# Patient Record
Sex: FEMALE | Race: WHITE | NOT HISPANIC OR LATINO | Employment: OTHER | URBAN - METROPOLITAN AREA
[De-identification: names, ages, dates, MRNs, and addresses within clinical notes are randomized per-mention and may not be internally consistent; named-entity substitution may affect disease eponyms.]

---

## 2021-11-12 ENCOUNTER — OFFICE VISIT (OUTPATIENT)
Dept: OTOLARYNGOLOGY | Facility: CLINIC | Age: 46
End: 2021-11-12
Payer: COMMERCIAL

## 2021-11-12 ENCOUNTER — TELEPHONE (OUTPATIENT)
Dept: OTOLARYNGOLOGY | Facility: CLINIC | Age: 46
End: 2021-11-12

## 2021-11-12 VITALS
WEIGHT: 170 LBS | DIASTOLIC BLOOD PRESSURE: 92 MMHG | TEMPERATURE: 98.4 F | HEART RATE: 92 BPM | SYSTOLIC BLOOD PRESSURE: 142 MMHG

## 2021-11-12 DIAGNOSIS — H60.392 INFECTIVE OTITIS EXTERNA, LEFT: Primary | ICD-10-CM

## 2021-11-12 DIAGNOSIS — H66.002 NON-RECURRENT ACUTE SUPPURATIVE OTITIS MEDIA OF LEFT EAR WITHOUT SPONTANEOUS RUPTURE OF TYMPANIC MEMBRANE: ICD-10-CM

## 2021-11-12 PROCEDURE — 92504 EAR MICROSCOPY EXAMINATION: CPT | Performed by: OTOLARYNGOLOGY

## 2021-11-12 PROCEDURE — 99203 OFFICE O/P NEW LOW 30 MIN: CPT | Mod: 25 | Performed by: OTOLARYNGOLOGY

## 2021-11-12 RX ORDER — CIPROFLOXACIN AND DEXAMETHASONE 3; 1 MG/ML; MG/ML
4 SUSPENSION/ DROPS AURICULAR (OTIC) 2 TIMES DAILY
COMMUNITY
Start: 2021-11-10

## 2021-11-12 RX ORDER — OXYCODONE HYDROCHLORIDE 5 MG/1
5 TABLET ORAL EVERY 6 HOURS PRN
Qty: 10 TABLET | Refills: 0 | Status: SHIPPED | OUTPATIENT
Start: 2021-11-12 | End: 2021-11-22

## 2021-11-12 RX ORDER — SULFAMETHOXAZOLE/TRIMETHOPRIM 800-160 MG
1 TABLET ORAL 2 TIMES DAILY
Qty: 20 TABLET | Refills: 0 | Status: SHIPPED | OUTPATIENT
Start: 2021-11-12 | End: 2021-11-22

## 2021-11-12 RX ORDER — PREDNISONE 10 MG/1
30 TABLET ORAL DAILY
Qty: 15 TABLET | Refills: 0 | Status: SHIPPED | OUTPATIENT
Start: 2021-11-12 | End: 2021-11-17

## 2021-11-12 ASSESSMENT — PAIN SCALES - GENERAL: PAINLEVEL: MILD PAIN (2)

## 2021-11-12 NOTE — TELEPHONE ENCOUNTER
Reason for Call:  Same Day Appointment, Requested Provider: Amber Escobedo     PCP: Monica Adkins      Reason for visit: ear bleeding, puss, extreme pain     Duration of symptoms: several weeks     Have you been treated for this in the past? Yes     Additional comments: Patient has been treated by her PCP, emergency room and a chiropractor to help with her ear but it just keeps getting worse and worse. Requesting to get worked in for an emergency appointment today, 11/12/21 at [place] or any other location that has ENT today. Please call as soon as possible. A work in message has also been sent to the [facility] ENT team.      Can we leave a detailed message on this number? YES     Phone number patient can be reached at: Home number on file [phone] (home)     Best Time: any     Call taken on 11/12/2021 at 9:58 AM by Ashlyn Gray

## 2021-11-12 NOTE — TELEPHONE ENCOUNTER
Eleno Aguillon MD Lenarz, Anna, MA; [facility] T[facility]  Caller: Unspecified (Today,  9:50 AM)  She's from [place], [place]. I'd see if she can get in to see Dr. Clark or Dr. Escobedo in [place] since that is much closer. I'll be done by the time she gets to [place].     Thanks,       Chuckie

## 2021-11-12 NOTE — TELEPHONE ENCOUNTER
Please triage and see if patient needs to be seen today. Pt currently scheduled next week with Dr. Aguillon on 11/17.    Thanks, Darling

## 2021-11-12 NOTE — PATIENT INSTRUCTIONS
Per physician's instructions    ACUTE OTITIS EXTERNA INSTRUCTIONS    1. Acetaminophen 1000 mg every 6 hours as needed  2. Ibuprofen 600 mg every 6 hours as needed  3. Oxycodone 5 mg every 4 hours as needed  4. Oral antibiotic as prescribed  5. Ciprodex drops 5 drops to draining ear twice daily for 10 days  6. Return for ear debridement as scheduled

## 2021-11-12 NOTE — TELEPHONE ENCOUNTER
Reason for Call:  Same Day Appointment, Requested Provider:  Eleno Aguillon MD    PCP: Monica Adkins    Reason for visit: ear bleeding, puss, extreme pain    Duration of symptoms: several weeks    Have you been treated for this in the past? Yes    Additional comments: Patient has been treated by her PCP, emergency room and a chiropractor to help with her ear but it just keeps getting worse and worse. Requesting to get worked in for an emergency appointment today, 11/12/21 at [place] or any other location that has ENT today. Please call as soon as possible.     Can we leave a detailed message on this number? YES    Phone number patient can be reached at: Home number on file [phone] (home)    Best Time: any    Call taken on 11/12/2021 at 9:50 AM by Ashlyn Gray

## 2021-11-12 NOTE — PROGRESS NOTES
Chief Complaint   Patient presents with     RECHECK     History of Present Illness  Ava Lopez is a 46 year old female who presents today for follow-up.  I evaluated the patient on 11/12/2021 with left otitis externa/otitis media.  I had her continue the Ciprodex drops, I placed her on oral Bactrim and a burst of prednisone.  We also discussed pain control including Tylenol, ibuprofen, and oral oxycodone as needed for breakthrough.  The patient returns today for follow-up.    Since last seeing the patient, the patient reports overall improvement in her symptoms.  The left ear is still quite plugged but she is no longer having ear pain has been able to sleep at night.  She is noticing more pulsatile tinnitus and some decreased hearing on the left.  She does not have any prior history of ear surgery.    Past Medical History  There is no problem list on file for this patient.    Current Medications    Current Outpatient Medications:      ciprofloxacin-dexamethasone (CIPRODEX) 0.3-0.1 % otic suspension, Place 4 drops Into the left ear 2 times daily, Disp: , Rfl:      oxyCODONE (ROXICODONE) 5 MG tablet, Take 1 tablet (5 mg) by mouth every 6 hours as needed for severe pain Pain not controlled with acetaminophen or ibuprofen., Disp: 10 tablet, Rfl: 0     predniSONE (DELTASONE) 10 MG tablet, Take 3 tablets (30 mg) by mouth daily for 5 days, Disp: 15 tablet, Rfl: 0     sulfamethoxazole-trimethoprim (BACTRIM DS) 800-160 MG tablet, Take 1 tablet by mouth 2 times daily for 10 days, Disp: 20 tablet, Rfl: 0    Allergies  Allergies   Allergen Reactions     Benzodiazepines Headache       Social History  Social History     Socioeconomic History     Marital status:      Spouse name: Not on file     Number of children: Not on file     Years of education: Not on file     Highest education level: Not on file   Occupational History     Not on file   Tobacco Use     Smoking status: Never Smoker     Smokeless tobacco: Never  "Used   Substance and Sexual Activity     Alcohol use: Never     Drug use: Never     Sexual activity: Not on file   Other Topics Concern     Not on file   Social History Narrative     Not on file     Social Determinants of Health     Financial Resource Strain: Not on file   Food Insecurity: Not on file   Transportation Needs: Not on file   Physical Activity: Not on file   Stress: Not on file   Social Connections: Not on file   Intimate Partner Violence: Not on file   Housing Stability: Not on file       Family History  Family History   Problem Relation Age of Onset     Coronary Artery Disease Father 36        MI       Review of Systems  As per HPI and PMHx, otherwise 10 system review including the head and neck, constitutional, eyes, respiratory, GI, skin, neurologic, lymphatic, endocrine, and allergy systems is negative.    Physical Exam  /77 (BP Location: Right arm, Patient Position: Sitting, Cuff Size: Adult Regular)   Pulse 78   Temp 98.2  F (36.8  C) (Tympanic)   Ht 1.702 m (5' 7\")   Wt 77.1 kg (170 lb)   BMI 26.63 kg/m    GENERAL: Patient is a pleasant, cooperative 46 year old female in no acute distress.  HEAD: Normocephalic, atraumatic.  Hair and scalp are normal.  EYES: Pupils are equal, round, reactive to light and accommodation.  Extraocular movements are intact.  The sclera nonicteric without injection.  The extraocular structures are normal.  EARS: See below.  NEUROLOGIC: Cranial nerves II through XII are grossly intact.  Voice is strong.  Patient is House-Brackmann I/VI bilaterally.  CARDIOVASCULAR: Extremities are warm and well-perfused.  No significant peripheral edema.  RESPIRATORY: Patient has nonlabored breathing without cough, wheeze, stridor.  PSYCHIATRIC: Patient is alert and oriented.  Mood and affect appear normal.  SKIN: Warm and dry.  No scalp, face, or neck lesions noted.    Physical Exam and Procedure    EARS: Normal shape and symmetry.  No tenderness when palpating the mastoid " or tragal areas bilaterally.  The ears were then examined under the otic binocular microscope to perform cerumen removal.  Otoscopic exam on the right reveals a clear canal.  The right tympanic membrane was round, intact without evidence of effusion.  No retraction, granulation, drainage, or evidence of cholesteatoma.      Attention was turned to the left ear.  Otoscopic exam on the left reveals moist ceruminous debris impacted down to the level of the tympanic membrane.  The cerumen debris and otorrhea were removed with a #5 suction. The left tympanic membrane was round, intact with obvious evidence of serous middle ear effusion.   No retraction, granulation, or evidence of cholesteatoma.  Tuning fork examination reveals bone conduction greater than air conduction on the left.    Assessment and Plan     ICD-10-CM    1. Infective otitis externa, left  H60.392 Remove Cerumen   2. Non-recurrent acute suppurative otitis media of left ear without spontaneous rupture of tympanic membrane  H66.002 Remove Cerumen      It was my pleasure seeing Ava Lopez today in clinic.  The patient has had significant interval improvement in her symptoms but does have otitis media with effusion on the left.  She does have a flight in about a week and I doubt that she will be able to clear her effusion by then.  We discussed careful observation with seeing her back next week prior to her flight with ear tube placement versus placing an ear tube now.  She is really bothered by the hearing loss and pulsatile tinnitus and has had symptoms for over a month.  She feels like she would just like an ear tube, which I think is reasonable.    We discussed the risks, benefits, alternatives, options of left myringotomy with tube placement including, but not limited to: Risk of bleeding, risk of infection, risk of retained tympanostomy tube, risk of tympanic membrane perforation, risk of recurrent otorrhea, tympanostomy tube failure, potential  need for additional procedures including tube removal/replacement.  We discussed the postoperative course and convalescence including using eardrops.  The patient voiced understanding and is willing to proceed.  Please see the procedure note for further details.    The patient will finish up her eardrops, oral antibiotic, and prednisone.  I will see her back in 4-6 weeks with an audiogram.    Ezequiel Escobedo MD  Department of Otolaryngology-Head and Neck Surgery  [facility]

## 2021-11-12 NOTE — PROGRESS NOTES
Chief Complaint   Patient presents with     Ear Problem     c/o ear pressure and some drainage noted seen in ER  in [facility] and put on ciprodex drops- ear still draining and feels better after using Ibuprofen 800 mg - history of right ear infection on 10/28/21     History of Present Illness  Ava Lopez is a 46 year old female who presents to today for ear evaluation.  Patient reports a several day history of worsening ear pain/pressure/plugging, and now otorrhea on the left-hand side.  Symptoms started initially in October where she had Covid.  She developed bilateral middle ear effusion.  She saw a chiropractor and after adjustment felt like her nose and sinuses drained really well but then she began to have some issues in the left ear.  She was seen in an urgent setting and they placed her on some eardrops for otitis externa.  Prior to this, she was on oral amoxicillin for a period of time without significant benefit or improvement.  She reports left right-sided otalgia, otorrhea, bloody otorrhea.  No significant vertigo.  No prior history of ear surgery.  She denies any recent water exposure or ear canal trauma.     Past Medical History  There is no problem list on file for this patient.    Current Medications     Current Outpatient Medications:      ciprofloxacin-dexamethasone (CIPRODEX) 0.3-0.1 % otic suspension, Place 4 drops Into the left ear 2 times daily, Disp: , Rfl:      oxyCODONE (ROXICODONE) 5 MG tablet, Take 1 tablet (5 mg) by mouth every 6 hours as needed for severe pain Pain not controlled with acetaminophen or ibuprofen., Disp: 10 tablet, Rfl: 0     predniSONE (DELTASONE) 10 MG tablet, Take 3 tablets (30 mg) by mouth daily for 5 days, Disp: 15 tablet, Rfl: 0     sulfamethoxazole-trimethoprim (BACTRIM DS) 800-160 MG tablet, Take 1 tablet by mouth 2 times daily for 10 days, Disp: 20 tablet, Rfl: 0    Allergies  Allergies   Allergen Reactions     Benzodiazepines Headache       Social  History   Social History     Socioeconomic History     Marital status:      Spouse name: Not on file     Number of children: Not on file     Years of education: Not on file     Highest education level: Not on file   Occupational History     Not on file   Tobacco Use     Smoking status: Not on file     Smokeless tobacco: Not on file   Substance and Sexual Activity     Alcohol use: Not on file     Drug use: Not on file     Sexual activity: Not on file   Other Topics Concern     Not on file   Social History Narrative     Not on file     Social Determinants of Health     Financial Resource Strain: Not on file   Food Insecurity: Not on file   Transportation Needs: Not on file   Physical Activity: Not on file   Stress: Not on file   Social Connections: Not on file   Intimate Partner Violence: Not on file   Housing Stability: Not on file       Family History  Family History   Problem Relation Age of Onset     Coronary Artery Disease Father 36        MI       Review of Systems  As per HPI and PMHx, otherwise 10+ comprehensive system review is negative.    Physical Exam  BP (!) 142/92 (BP Location: Right arm, Patient Position: Chair, Cuff Size: Adult Regular)   Pulse 92   Temp 98.4  F (36.9  C) (Tympanic)   Wt 77.1 kg (170 lb)   GENERAL: Patient is a pleasant, cooperative 46 year old female in no acute distress.  HEAD: Normocephalic, atraumatic.  Hair and scalp are normal.  EYES: Pupils are equal, round, reactive to light and accommodation.  Extraocular movements are intact.  The sclera nonicteric without injection.  The extraocular structures are normal.  EARS: See below..    NEUROLOGIC: Cranial nerves II through XII are grossly intact.  Voice is strong.  Patient is House-Brackmann I/VI bilaterally.  CARDIOVASCULAR: Extremities are warm and well-perfused.  No significant peripheral edema.  RESPIRATORY: Patient has nonlabored breathing without cough, wheeze, stridor.  PSYCHIATRIC: Patient is alert and oriented.   Mood and affect appear normal.  SKIN: Warm and dry.  No scalp, face, or neck lesions noted.    Physical Exam and Procedure    EARS: Normal shape and symmetry.  No tenderness when palpating the mastoid areas bilaterally.  No mastoid erythema or fluctuance.  She does have some mild tragal tenderness on the left.  The ears were then examined under the otic binocular microscope to perform cerumen removal.  Otoscopic exam on the right reveals a clear canal.  The right tympanic membrane was round, intact without evidence of effusion.  No retraction, granulation, drainage, or evidence of cholesteatoma.      Attention was turned to the left ear.  Otoscopic exam on the left reveals moist ceruminous debris impacted down to the level of the tympanic membrane.  The cerumen was removed with a #5 suction.  The left tympanic membrane appears intact but very thickened with some granulation posteriorly and some active otorrhea from the anterior sulcus.  There does appear to be evidence of middle ear effusion/purulent middle ear effusion.  No significant retraction.    Assessment and Plan     ICD-10-CM    1. Infective otitis externa, left  H60.392 Remove Cerumen     sulfamethoxazole-trimethoprim (BACTRIM DS) 800-160 MG tablet     predniSONE (DELTASONE) 10 MG tablet     oxyCODONE (ROXICODONE) 5 MG tablet   2. Non-recurrent acute suppurative otitis media of left ear without spontaneous rupture of tympanic membrane  H66.002 Remove Cerumen     sulfamethoxazole-trimethoprim (BACTRIM DS) 800-160 MG tablet     predniSONE (DELTASONE) 10 MG tablet     oxyCODONE (ROXICODONE) 5 MG tablet     It was my pleasure seeing Ava STEVAN Quinterosagusto today in clinic.  The patient has a left ear otitis externa/otitis media.  I debrided the canal under the microscope.  She has Ciprodex drops that she has been prescribed.  We will continue the Ciprodex drops 4 to 5 drops twice a day for 10 days.  She was on oral amoxicillin without benefit.  She also struggled  with antibiotic associated diarrhea with the amoxicillin.  I think we will trial Bactrim DS twice a day for 10 days.  I will provide her with a short burst of prednisone to help with the swelling and discomfort.  We discussed Tylenol and ibuprofen alternating for pain control.  I also gave her some oxycodone for severe or breakthrough pain.  The patient will return to see me on Monday for repeat examination and debridement.  I discussed keeping the ear dry, and to not place anything in the ear except for the ear drops.     Ava to follow up with Primary Care provider regarding elevated blood pressure.    Ezequiel Escobedo MD  Department of Otolaryngology-Head and Neck Surgery  [facility]

## 2021-11-12 NOTE — LETTER
11/12/2021         RE: Ava Lopez  [address] [facility] [address]        Dear Colleague,    Thank you for referring your patient, Ava Lopez, to the [facility]. Please see a copy of my visit note below.    Chief Complaint   Patient presents with     Ear Problem     c/o ear pressure and some drainage noted seen in ER  in [facility] and put on ciprodex drops- ear still draining and feels better after using Ibuprofen 800 mg - history of right ear infection on 10/28/21     History of Present Illness  Ava Lopez is a 46 year old female who presents to today for ear evaluation.  Patient reports a several day history of worsening ear pain/pressure/plugging, and now otorrhea on the left-hand side.  Symptoms started initially in October where she had Covid.  She developed bilateral middle ear effusion.  She saw a chiropractor and after adjustment felt like her nose and sinuses drained really well but then she began to have some issues in the left ear.  She was seen in an urgent setting and they placed her on some eardrops for otitis externa.  Prior to this, she was on oral amoxicillin for a period of time without significant benefit or improvement.  She reports left right-sided otalgia, otorrhea, bloody otorrhea.  No significant vertigo.  No prior history of ear surgery.  She denies any recent water exposure or ear canal trauma.     Past Medical History  There is no problem list on file for this patient.    Current Medications     Current Outpatient Medications:      ciprofloxacin-dexamethasone (CIPRODEX) 0.3-0.1 % otic suspension, Place 4 drops Into the left ear 2 times daily, Disp: , Rfl:      oxyCODONE (ROXICODONE) 5 MG tablet, Take 1 tablet (5 mg) by mouth every 6 hours as needed for severe pain Pain not controlled with acetaminophen or ibuprofen., Disp: 10 tablet, Rfl: 0     predniSONE (DELTASONE) 10 MG tablet, Take 3 tablets (30 mg) by mouth daily for  5 days, Disp: 15 tablet, Rfl: 0     sulfamethoxazole-trimethoprim (BACTRIM DS) 800-160 MG tablet, Take 1 tablet by mouth 2 times daily for 10 days, Disp: 20 tablet, Rfl: 0    Allergies  Allergies   Allergen Reactions     Benzodiazepines Headache       Social History   Social History     Socioeconomic History     Marital status:      Spouse name: Not on file     Number of children: Not on file     Years of education: Not on file     Highest education level: Not on file   Occupational History     Not on file   Tobacco Use     Smoking status: Not on file     Smokeless tobacco: Not on file   Substance and Sexual Activity     Alcohol use: Not on file     Drug use: Not on file     Sexual activity: Not on file   Other Topics Concern     Not on file   Social History Narrative     Not on file     Social Determinants of Health     Financial Resource Strain: Not on file   Food Insecurity: Not on file   Transportation Needs: Not on file   Physical Activity: Not on file   Stress: Not on file   Social Connections: Not on file   Intimate Partner Violence: Not on file   Housing Stability: Not on file       Family History  Family History   Problem Relation Age of Onset     Coronary Artery Disease Father 36        MI       Review of Systems  As per HPI and PMHx, otherwise 10+ comprehensive system review is negative.    Physical Exam  BP (!) 142/92 (BP Location: Right arm, Patient Position: Chair, Cuff Size: Adult Regular)   Pulse 92   Temp 98.4  F (36.9  C) (Tympanic)   Wt 77.1 kg (170 lb)   GENERAL: Patient is a pleasant, cooperative 46 year old female in no acute distress.  HEAD: Normocephalic, atraumatic.  Hair and scalp are normal.  EYES: Pupils are equal, round, reactive to light and accommodation.  Extraocular movements are intact.  The sclera nonicteric without injection.  The extraocular structures are normal.  EARS: See below..    NEUROLOGIC: Cranial nerves II through XII are grossly intact.  Voice is strong.   Patient is House-Brackmann I/VI bilaterally.  CARDIOVASCULAR: Extremities are warm and well-perfused.  No significant peripheral edema.  RESPIRATORY: Patient has nonlabored breathing without cough, wheeze, stridor.  PSYCHIATRIC: Patient is alert and oriented.  Mood and affect appear normal.  SKIN: Warm and dry.  No scalp, face, or neck lesions noted.    Physical Exam and Procedure    EARS: Normal shape and symmetry.  No tenderness when palpating the mastoid areas bilaterally.  No mastoid erythema or fluctuance.  She does have some mild tragal tenderness on the left.  The ears were then examined under the otic binocular microscope to perform cerumen removal.  Otoscopic exam on the right reveals a clear canal.  The right tympanic membrane was round, intact without evidence of effusion.  No retraction, granulation, drainage, or evidence of cholesteatoma.      Attention was turned to the left ear.  Otoscopic exam on the left reveals moist ceruminous debris impacted down to the level of the tympanic membrane.  The cerumen was removed with a #5 suction.  The left tympanic membrane appears intact but very thickened with some granulation posteriorly and some active otorrhea from the anterior sulcus.  There does appear to be evidence of middle ear effusion/purulent middle ear effusion.  No significant retraction.    Assessment and Plan     ICD-10-CM    1. Infective otitis externa, left  H60.392 Remove Cerumen     sulfamethoxazole-trimethoprim (BACTRIM DS) 800-160 MG tablet     predniSONE (DELTASONE) 10 MG tablet     oxyCODONE (ROXICODONE) 5 MG tablet   2. Non-recurrent acute suppurative otitis media of left ear without spontaneous rupture of tympanic membrane  H66.002 Remove Cerumen     sulfamethoxazole-trimethoprim (BACTRIM DS) 800-160 MG tablet     predniSONE (DELTASONE) 10 MG tablet     oxyCODONE (ROXICODONE) 5 MG tablet     It was my pleasure seeing Ava Lopez today in clinic.  The patient has a left ear otitis  externa/otitis media.  I debrided the canal under the microscope.  She has Ciprodex drops that she has been prescribed.  We will continue the Ciprodex drops 4 to 5 drops twice a day for 10 days.  She was on oral amoxicillin without benefit.  She also struggled with antibiotic associated diarrhea with the amoxicillin.  I think we will trial Bactrim DS twice a day for 10 days.  I will provide her with a short burst of prednisone to help with the swelling and discomfort.  We discussed Tylenol and ibuprofen alternating for pain control.  I also gave her some oxycodone for severe or breakthrough pain.  The patient will return to see me on Monday for repeat examination and debridement.  I discussed keeping the ear dry, and to not place anything in the ear except for the ear drops.     Ava to follow up with Primary Care provider regarding elevated blood pressure.    Ezequiel Escobedo MD  Department of Otolaryngology-Head and Neck Surgery  [facility]         Again, thank you for allowing me to participate in the care of your patient.        Sincerely,        Ezequiel Escobedo MD

## 2021-11-12 NOTE — NURSING NOTE
Initial BP (!) 142/92 (BP Location: Right arm, Patient Position: Chair, Cuff Size: Adult Regular)   Pulse 92   Temp 98.4  F (36.9  C) (Tympanic)   Wt 77.1 kg (170 lb)  There is no height or weight on file to calculate BMI. .    Josiane Martinez LPN

## 2021-11-15 ENCOUNTER — OFFICE VISIT (OUTPATIENT)
Dept: OTOLARYNGOLOGY | Facility: CLINIC | Age: 46
End: 2021-11-15
Payer: COMMERCIAL

## 2021-11-15 VITALS
HEIGHT: 67 IN | BODY MASS INDEX: 26.68 KG/M2 | DIASTOLIC BLOOD PRESSURE: 77 MMHG | WEIGHT: 170 LBS | TEMPERATURE: 98.2 F | SYSTOLIC BLOOD PRESSURE: 133 MMHG | HEART RATE: 78 BPM

## 2021-11-15 DIAGNOSIS — H66.002 NON-RECURRENT ACUTE SUPPURATIVE OTITIS MEDIA OF LEFT EAR WITHOUT SPONTANEOUS RUPTURE OF TYMPANIC MEMBRANE: ICD-10-CM

## 2021-11-15 DIAGNOSIS — H60.392 INFECTIVE OTITIS EXTERNA, LEFT: Primary | ICD-10-CM

## 2021-11-15 PROCEDURE — 99213 OFFICE O/P EST LOW 20 MIN: CPT | Mod: 25 | Performed by: OTOLARYNGOLOGY

## 2021-11-15 PROCEDURE — 69433 CREATE EARDRUM OPENING: CPT | Mod: LT | Performed by: OTOLARYNGOLOGY

## 2021-11-15 ASSESSMENT — MIFFLIN-ST. JEOR: SCORE: 1448.74

## 2021-11-15 NOTE — PROCEDURES
PREOPERATIVE DIAGNOSES: Left chronic otitis media with effusion, history of acute otitis media, left conductive hearing loss.    POSTOPERATIVE DIAGNOSES: Same.     PROCEDURE PERFORMED:   1. Left myringotomy with tympanostomy tube placement     SURGEON: Ezequiel Escobedo MD      ASSISTANTS: None.     BLOOD LOSS: Scant.     COMPLICATIONS: None.      SPECIMENS: None.     ANESTHESIA: Local.     GRAFTS, IMPLANTS, DRAINS: None.     INDICATIONS: Prevent complications, treat disease.    FINDINGS:   1. Left intact tympanic membrane with copious serous middle-ear effusion.    OPERATIVE TECHNIQUE: The patient was brought to the procedure room and identified by name clinic number.  They were placed supinely on the procedure chair.  The patient was prepped and draped in standard fashion.  After standard surgical pause, the microscope was brought to the field and used throughout the remainder of the case.  I examined the ear on the left. Cerumen was cleaned with curette.  Phenol was placed in the posterior-inferior quadrant.  A radial myringotomy was made in the posterior-inferior quadrant.  The middle ear was suctioned free.  The patient noticed improvement in hearing.  A Dura-Vent ear tube was placed into the myringotomy.       This marked the end of the procedure.  The patient tolerated the procedure well.  There were no complications.  There was minimal blood loss.  All standard protocol and universal precautions were used throughout the procedure.    Ezequiel Escobedo MD  Department of Otolaryngology-Head and Neck Surgery  [facility]

## 2021-11-15 NOTE — NURSING NOTE
"Initial /77 (BP Location: Right arm, Patient Position: Sitting, Cuff Size: Adult Regular)   Pulse 78   Temp 98.2  F (36.8  C) (Tympanic)   Ht 1.702 m (5' 7\")   Wt 77.1 kg (170 lb)   BMI 26.63 kg/m   Estimated body mass index is 26.63 kg/m  as calculated from the following:    Height as of this encounter: 1.702 m (5' 7\").    Weight as of this encounter: 77.1 kg (170 lb). .    Thania Meehan LPN on 11/15/2021 at 8:02 AM    "

## 2021-11-15 NOTE — LETTER
11/15/2021         RE: Ava Lopez  [address] [address] [facility] [address]        Dear Colleague,    Thank you for referring your patient, Ava Lopez, to the [facility]. Please see a copy of my visit note below.    Chief Complaint   Patient presents with     RECHECK     History of Present Illness  Ava Lopez is a 46 year old female who presents today for follow-up.  I evaluated the patient on 11/12/2021 with left otitis externa/otitis media.  I had her continue the Ciprodex drops, I placed her on oral Bactrim and a burst of prednisone.  We also discussed pain control including Tylenol, ibuprofen, and oral oxycodone as needed for breakthrough.  The patient returns today for follow-up.    Since last seeing the patient, the patient reports overall improvement in her symptoms.  The left ear is still quite plugged but she is no longer having ear pain has been able to sleep at night.  She is noticing more pulsatile tinnitus and some decreased hearing on the left.  She does not have any prior history of ear surgery.    Past Medical History  There is no problem list on file for this patient.    Current Medications    Current Outpatient Medications:      ciprofloxacin-dexamethasone (CIPRODEX) 0.3-0.1 % otic suspension, Place 4 drops Into the left ear 2 times daily, Disp: , Rfl:      oxyCODONE (ROXICODONE) 5 MG tablet, Take 1 tablet (5 mg) by mouth every 6 hours as needed for severe pain Pain not controlled with acetaminophen or ibuprofen., Disp: 10 tablet, Rfl: 0     predniSONE (DELTASONE) 10 MG tablet, Take 3 tablets (30 mg) by mouth daily for 5 days, Disp: 15 tablet, Rfl: 0     sulfamethoxazole-trimethoprim (BACTRIM DS) 800-160 MG tablet, Take 1 tablet by mouth 2 times daily for 10 days, Disp: 20 tablet, Rfl: 0    Allergies  Allergies   Allergen Reactions     Benzodiazepines Headache       Social History  Social History     Socioeconomic History     Marital status:  "     Spouse name: Not on file     Number of children: Not on file     Years of education: Not on file     Highest education level: Not on file   Occupational History     Not on file   Tobacco Use     Smoking status: Never Smoker     Smokeless tobacco: Never Used   Substance and Sexual Activity     Alcohol use: Never     Drug use: Never     Sexual activity: Not on file   Other Topics Concern     Not on file   Social History Narrative     Not on file     Social Determinants of Health     Financial Resource Strain: Not on file   Food Insecurity: Not on file   Transportation Needs: Not on file   Physical Activity: Not on file   Stress: Not on file   Social Connections: Not on file   Intimate Partner Violence: Not on file   Housing Stability: Not on file       Family History  Family History   Problem Relation Age of Onset     Coronary Artery Disease Father 36        MI       Review of Systems  As per HPI and PMHx, otherwise 10 system review including the head and neck, constitutional, eyes, respiratory, GI, skin, neurologic, lymphatic, endocrine, and allergy systems is negative.    Physical Exam  /77 (BP Location: Right arm, Patient Position: Sitting, Cuff Size: Adult Regular)   Pulse 78   Temp 98.2  F (36.8  C) (Tympanic)   Ht 1.702 m (5' 7\")   Wt 77.1 kg (170 lb)   BMI 26.63 kg/m    GENERAL: Patient is a pleasant, cooperative 46 year old female in no acute distress.  HEAD: Normocephalic, atraumatic.  Hair and scalp are normal.  EYES: Pupils are equal, round, reactive to light and accommodation.  Extraocular movements are intact.  The sclera nonicteric without injection.  The extraocular structures are normal.  EARS: See below.  NEUROLOGIC: Cranial nerves II through XII are grossly intact.  Voice is strong.  Patient is House-Brackmann I/VI bilaterally.  CARDIOVASCULAR: Extremities are warm and well-perfused.  No significant peripheral edema.  RESPIRATORY: Patient has nonlabored breathing without cough, " wheeze, stridor.  PSYCHIATRIC: Patient is alert and oriented.  Mood and affect appear normal.  SKIN: Warm and dry.  No scalp, face, or neck lesions noted.    Physical Exam and Procedure    EARS: Normal shape and symmetry.  No tenderness when palpating the mastoid or tragal areas bilaterally.  The ears were then examined under the otic binocular microscope to perform cerumen removal.  Otoscopic exam on the right reveals a clear canal.  The right tympanic membrane was round, intact without evidence of effusion.  No retraction, granulation, drainage, or evidence of cholesteatoma.      Attention was turned to the left ear.  Otoscopic exam on the left reveals moist ceruminous debris impacted down to the level of the tympanic membrane.  The cerumen debris and otorrhea were removed with a #5 suction. The left tympanic membrane was round, intact with obvious evidence of serous middle ear effusion.   No retraction, granulation, or evidence of cholesteatoma.  Tuning fork examination reveals bone conduction greater than air conduction on the left.    Assessment and Plan     ICD-10-CM    1. Infective otitis externa, left  H60.392 Remove Cerumen   2. Non-recurrent acute suppurative otitis media of left ear without spontaneous rupture of tympanic membrane  H66.002 Remove Cerumen      It was my pleasure seeing Ava Lopez today in clinic.  The patient has had significant interval improvement in her symptoms but does have otitis media with effusion on the left.  She does have a flight in about a week and I doubt that she will be able to clear her effusion by then.  We discussed careful observation with seeing her back next week prior to her flight with ear tube placement versus placing an ear tube now.  She is really bothered by the hearing loss and pulsatile tinnitus and has had symptoms for over a month.  She feels like she would just like an ear tube, which I think is reasonable.    We discussed the risks, benefits,  alternatives, options of left myringotomy with tube placement including, but not limited to: Risk of bleeding, risk of infection, risk of retained tympanostomy tube, risk of tympanic membrane perforation, risk of recurrent otorrhea, tympanostomy tube failure, potential need for additional procedures including tube removal/replacement.  We discussed the postoperative course and convalescence including using eardrops.  The patient voiced understanding and is willing to proceed.  Please see the procedure note for further details.    The patient will finish up her eardrops, oral antibiotic, and prednisone.  I will see her back in 4-6 weeks with an audiogram.    Ezequiel Escobedo MD  Department of Otolaryngology-Head and Neck Surgery  [facility]         Again, thank you for allowing me to participate in the care of your patient.        Sincerely,        Ezequiel Escobedo MD

## 2021-11-22 ENCOUNTER — OFFICE VISIT (OUTPATIENT)
Dept: OTOLARYNGOLOGY | Facility: CLINIC | Age: 46
End: 2021-11-22
Payer: COMMERCIAL

## 2021-11-22 ENCOUNTER — OFFICE VISIT (OUTPATIENT)
Dept: AUDIOLOGY | Facility: CLINIC | Age: 46
End: 2021-11-22
Payer: COMMERCIAL

## 2021-11-22 VITALS
HEART RATE: 72 BPM | DIASTOLIC BLOOD PRESSURE: 81 MMHG | WEIGHT: 170 LBS | TEMPERATURE: 98.2 F | BODY MASS INDEX: 26.63 KG/M2 | SYSTOLIC BLOOD PRESSURE: 129 MMHG

## 2021-11-22 DIAGNOSIS — H93.8X2 EAR FULLNESS, LEFT: ICD-10-CM

## 2021-11-22 DIAGNOSIS — H90.72 MIXED CONDUCTIVE AND SENSORINEURAL HEARING LOSS OF LEFT EAR WITH UNRESTRICTED HEARING OF RIGHT EAR: ICD-10-CM

## 2021-11-22 DIAGNOSIS — H60.392 INFECTIVE OTITIS EXTERNA, LEFT: Primary | ICD-10-CM

## 2021-11-22 DIAGNOSIS — H90.72 MIXED CONDUCTIVE AND SENSORINEURAL HEARING LOSS OF LEFT EAR WITH UNRESTRICTED HEARING OF RIGHT EAR: Primary | ICD-10-CM

## 2021-11-22 DIAGNOSIS — Z96.22 RETAINED MYRINGOTOMY TUBE IN LEFT EAR: ICD-10-CM

## 2021-11-22 DIAGNOSIS — H66.002 NON-RECURRENT ACUTE SUPPURATIVE OTITIS MEDIA OF LEFT EAR WITHOUT SPONTANEOUS RUPTURE OF TYMPANIC MEMBRANE: ICD-10-CM

## 2021-11-22 PROCEDURE — 92504 EAR MICROSCOPY EXAMINATION: CPT | Performed by: OTOLARYNGOLOGY

## 2021-11-22 PROCEDURE — 92557 COMPREHENSIVE HEARING TEST: CPT | Performed by: AUDIOLOGIST

## 2021-11-22 PROCEDURE — 92550 TYMPANOMETRY & REFLEX THRESH: CPT | Performed by: AUDIOLOGIST

## 2021-11-22 PROCEDURE — 99207 PR NO CHARGE LOS: CPT | Performed by: AUDIOLOGIST

## 2021-11-22 PROCEDURE — 99024 POSTOP FOLLOW-UP VISIT: CPT | Mod: 25 | Performed by: OTOLARYNGOLOGY

## 2021-11-22 RX ORDER — GUAIFENESIN AND DEXTROMETHORPHAN HYDROBROMIDE 600; 30 MG/1; MG/1
1 TABLET, EXTENDED RELEASE ORAL EVERY 12 HOURS
COMMUNITY

## 2021-11-22 ASSESSMENT — PAIN SCALES - GENERAL: PAINLEVEL: NO PAIN (0)

## 2021-11-22 NOTE — PROGRESS NOTES
Chief Complaint   Patient presents with     Ear Problem     hearing in left ear with some drainage noted and still c/o pressure/audio     History of Present Illness  Ava Lopez is a 46 year old female who presents today for follow-up.  I initially evaluated the patient on 11/12/2021 with left otitis externa/otitis media.  I had her continue the Ciprodex drops, I placed her on oral Bactrim and a burst of prednisone.  We also discussed pain control including Tylenol, ibuprofen, and oral oxycodone as needed for breakthrough.  The patient was again evaluated on 11/15/2021 with persistent middle ear effusion.  We did place an ear tube in office on the left-hand side.  The patient contacted me over the weekend that she was still having some ear plugging and tinnitus despite the drainage improving.  The patient returns today for follow-up.     Since last seeing the patient, the patient reports improvement in her ear pain and drainage.  She still is having some ear fullness and sometimes some drainage noted at nighttime.  She does report some ear pressure and some tinnitus, both some high-frequency ringing but also a lower pitched hum.  The pulsatile tinnitus has resolved since placement of the ear tube. Aside from her recent ear tube, she does not have any prior history of ear surgery.    Past Medical History  There is no problem list on file for this patient.    Current Medications    Current Outpatient Medications:      ciprofloxacin-dexamethasone (CIPRODEX) 0.3-0.1 % otic suspension, Place 4 drops Into the left ear 2 times daily, Disp: , Rfl:      dextromethorphan-guaiFENesin (MUCINEX DM)  MG 12 hr tablet, Take 1 tablet by mouth every 12 hours, Disp: , Rfl:     Allergies  Allergies   Allergen Reactions     Benzodiazepines Headache       Social History  Social History     Socioeconomic History     Marital status:      Spouse name: Not on file     Number of children: Not on file     Years of  education: Not on file     Highest education level: Not on file   Occupational History     Not on file   Tobacco Use     Smoking status: Never Smoker     Smokeless tobacco: Never Used   Substance and Sexual Activity     Alcohol use: Never     Drug use: Never     Sexual activity: Not on file   Other Topics Concern     Not on file   Social History Narrative     Not on file     Social Determinants of Health     Financial Resource Strain: Not on file   Food Insecurity: Not on file   Transportation Needs: Not on file   Physical Activity: Not on file   Stress: Not on file   Social Connections: Not on file   Intimate Partner Violence: Not on file   Housing Stability: Not on file       Family History  Family History   Problem Relation Age of Onset     Coronary Artery Disease Father 36        MI       Review of Systems  As per HPI and PMHx, otherwise 10 system review including the head and neck, constitutional, eyes, respiratory, GI, skin, neurologic, lymphatic, endocrine, and allergy systems is negative.    Physical Exam  /81 (BP Location: Right arm, Patient Position: Chair, Cuff Size: Adult Large)   Pulse 72   Temp 98.2  F (36.8  C) (Tympanic)   Wt 77.1 kg (170 lb)   BMI 26.63 kg/m    GENERAL: Patient is a pleasant, cooperative 46 year old female in no acute distress.  HEAD: Normocephalic, atraumatic.  Hair and scalp are normal.  EYES: Pupils are equal, round, reactive to light and accommodation.  Extraocular movements are intact.  The sclera nonicteric without injection.  The extraocular structures are normal.  EARS: See below.  NEUROLOGIC: Cranial nerves II through XII are grossly intact.  Voice is strong.  Patient is House-Brackmann I/VI bilaterally.  CARDIOVASCULAR: Extremities are warm and well-perfused.  No significant peripheral edema.  RESPIRATORY: Patient has nonlabored breathing without cough, wheeze, stridor.  PSYCHIATRIC: Patient is alert and oriented.  Mood and affect appear normal.  SKIN: Warm and  dry.  No scalp, face, or neck lesions noted.     Physical Exam and Procedure     EARS: Normal shape and symmetry.  No tenderness when palpating the mastoid or tragal areas bilaterally.  The ears were then examined under the otic binocular microscope to perform cerumen removal.  Otoscopic exam on the right reveals a clear canal.  The right tympanic membrane was round, intact without evidence of effusion.  No retraction, granulation, drainage, or evidence of cholesteatoma.       Attention was turned to the left ear.  Otoscopic exam on the left reveals a clear canal.  There is some dry drainage and ceruminous debris overlying the tympanic membrane and surrounding the ear tube.  I was able to debride this using an angled pick, #3 suction, #5 suction, and alligator forceps.  There is a Dura-Vent ear tube in the posterior-inferior quadrant.  The middle ear is well aerated.  No granulation or active drainage.      Audiogram  The patient underwent an audiogram performed today.  My review of the audiogram shows her more hearing in the right ear and moderate low-frequency conductive hearing loss sloping to normal hearing to 3000 Hz sloping to mild high-frequency sensorineural hearing loss in the left ear.  Pure-tone average is 11 dB on the right and 20 dB on the left.  Speech reception threshold is 15 dB on the right and 20 dB on the left.  The patient had 100% word recognition on the right and 96% word recognition on the left.  The patient had a type A tympanogram on the right and a intermediate volume type B tympanogram on the left.    Assessment and Plan     ICD-10-CM    1. Infective otitis externa, left  H60.392 Microscopy, Binocular     Adult Audiology Referral   2. Non-recurrent acute suppurative otitis media of left ear without spontaneous rupture of tympanic membrane  H66.002 Microscopy, Binocular     Adult Audiology Referral   3. Retained myringotomy tube in left ear  Z96.22 Microscopy, Binocular     Adult Audiology  Referral   4. Ear fullness, left  H93.8X2 Microscopy, Binocular     Adult Audiology Referral   5. Mixed conductive and sensorineural hearing loss of left ear with unrestricted hearing of right ear  H90.72 Microscopy, Binocular     Adult Audiology Referral      It was my pleasure seeing Ava Lopez today in clinic.  The patient's ear tube is in good placement.  I did debride her ear today in office.  Her audiogram would suggest some low-frequency conductive hearing loss and some high-frequency sensorineural loss.  I think that some of her ear fullness is related to the debris that I did remove today in office.  Also, the ear tube can maybe contribute to some fullness.  I would recommend observation at this time with follow-up examination in December with repeat audiometric assessment.  We will have the patient stop drop treatment at this time but she will Dundore eardrops if needed in the future.  We will see her back in December for follow-up.    Ezequiel Escobedo MD  Department of Otolaryngology-Head and Neck Surgery  [facility]

## 2021-11-22 NOTE — NURSING NOTE
"Initial /81 (BP Location: Right arm, Patient Position: Chair, Cuff Size: Adult Large)   Pulse 72   Temp 98.2  F (36.8  C) (Tympanic)   Wt 77.1 kg (170 lb)   BMI 26.63 kg/m   Estimated body mass index is 26.63 kg/m  as calculated from the following:    Height as of 11/15/21: 1.702 m (5' 7\").    Weight as of this encounter: 77.1 kg (170 lb). .    Josiane Martinez LPN    "

## 2021-11-22 NOTE — LETTER
11/22/2021         RE: Ava Lopez  [address] [facility] [address]        Dear Colleague,    Thank you for referring your patient, Ava Lopez, to the [facility]. Please see a copy of my visit note below.    Chief Complaint   Patient presents with     Ear Problem     hearing in left ear with some drainage noted and still c/o pressure/audio     History of Present Illness  Ava Lopez is a 46 year old female who presents today for follow-up.  I initially evaluated the patient on 11/12/2021 with left otitis externa/otitis media.  I had her continue the Ciprodex drops, I placed her on oral Bactrim and a burst of prednisone.  We also discussed pain control including Tylenol, ibuprofen, and oral oxycodone as needed for breakthrough.  The patient was again evaluated on 11/15/2021 with persistent middle ear effusion.  We did place an ear tube in office on the left-hand side.  The patient contacted me over the weekend that she was still having some ear plugging and tinnitus despite the drainage improving.  The patient returns today for follow-up.     Since last seeing the patient, the patient reports improvement in her ear pain and drainage.  She still is having some ear fullness and sometimes some drainage noted at nighttime.  She does report some ear pressure and some tinnitus, both some high-frequency ringing but also a lower pitched hum.  The pulsatile tinnitus has resolved since placement of the ear tube. Aside from her recent ear tube, she does not have any prior history of ear surgery.    Past Medical History  There is no problem list on file for this patient.    Current Medications    Current Outpatient Medications:      ciprofloxacin-dexamethasone (CIPRODEX) 0.3-0.1 % otic suspension, Place 4 drops Into the left ear 2 times daily, Disp: , Rfl:      dextromethorphan-guaiFENesin (MUCINEX DM)  MG 12 hr tablet, Take 1 tablet by mouth every 12 hours, Disp:  , Rfl:     Allergies  Allergies   Allergen Reactions     Benzodiazepines Headache       Social History  Social History     Socioeconomic History     Marital status:      Spouse name: Not on file     Number of children: Not on file     Years of education: Not on file     Highest education level: Not on file   Occupational History     Not on file   Tobacco Use     Smoking status: Never Smoker     Smokeless tobacco: Never Used   Substance and Sexual Activity     Alcohol use: Never     Drug use: Never     Sexual activity: Not on file   Other Topics Concern     Not on file   Social History Narrative     Not on file     Social Determinants of Health     Financial Resource Strain: Not on file   Food Insecurity: Not on file   Transportation Needs: Not on file   Physical Activity: Not on file   Stress: Not on file   Social Connections: Not on file   Intimate Partner Violence: Not on file   Housing Stability: Not on file       Family History  Family History   Problem Relation Age of Onset     Coronary Artery Disease Father 36        MI       Review of Systems  As per HPI and PMHx, otherwise 10 system review including the head and neck, constitutional, eyes, respiratory, GI, skin, neurologic, lymphatic, endocrine, and allergy systems is negative.    Physical Exam  /81 (BP Location: Right arm, Patient Position: Chair, Cuff Size: Adult Large)   Pulse 72   Temp 98.2  F (36.8  C) (Tympanic)   Wt 77.1 kg (170 lb)   BMI 26.63 kg/m    GENERAL: Patient is a pleasant, cooperative 46 year old female in no acute distress.  HEAD: Normocephalic, atraumatic.  Hair and scalp are normal.  EYES: Pupils are equal, round, reactive to light and accommodation.  Extraocular movements are intact.  The sclera nonicteric without injection.  The extraocular structures are normal.  EARS: See below.  NEUROLOGIC: Cranial nerves II through XII are grossly intact.  Voice is strong.  Patient is House-Brackmann I/VI  bilaterally.  CARDIOVASCULAR: Extremities are warm and well-perfused.  No significant peripheral edema.  RESPIRATORY: Patient has nonlabored breathing without cough, wheeze, stridor.  PSYCHIATRIC: Patient is alert and oriented.  Mood and affect appear normal.  SKIN: Warm and dry.  No scalp, face, or neck lesions noted.     Physical Exam and Procedure     EARS: Normal shape and symmetry.  No tenderness when palpating the mastoid or tragal areas bilaterally.  The ears were then examined under the otic binocular microscope to perform cerumen removal.  Otoscopic exam on the right reveals a clear canal.  The right tympanic membrane was round, intact without evidence of effusion.  No retraction, granulation, drainage, or evidence of cholesteatoma.       Attention was turned to the left ear.  Otoscopic exam on the left reveals a clear canal.  There is some dry drainage and ceruminous debris overlying the tympanic membrane and surrounding the ear tube.  I was able to debride this using an angled pick, #3 suction, #5 suction, and alligator forceps.  There is a Dura-Vent ear tube in the posterior-inferior quadrant.  The middle ear is well aerated.  No granulation or active drainage.      Audiogram  The patient underwent an audiogram performed today.  My review of the audiogram shows her more hearing in the right ear and moderate low-frequency conductive hearing loss sloping to normal hearing to 3000 Hz sloping to mild high-frequency sensorineural hearing loss in the left ear.  Pure-tone average is 11 dB on the right and 20 dB on the left.  Speech reception threshold is 15 dB on the right and 20 dB on the left.  The patient had 100% word recognition on the right and 96% word recognition on the left.  The patient had a type A tympanogram on the right and a intermediate volume type B tympanogram on the left.    Assessment and Plan     ICD-10-CM    1. Infective otitis externa, left  H60.392 Microscopy, Binocular     Adult  Audiology Referral   2. Non-recurrent acute suppurative otitis media of left ear without spontaneous rupture of tympanic membrane  H66.002 Microscopy, Binocular     Adult Audiology Referral   3. Retained myringotomy tube in left ear  Z96.22 Microscopy, Binocular     Adult Audiology Referral   4. Ear fullness, left  H93.8X2 Microscopy, Binocular     Adult Audiology Referral   5. Mixed conductive and sensorineural hearing loss of left ear with unrestricted hearing of right ear  H90.72 Microscopy, Binocular     Adult Audiology Referral      It was my pleasure seeing Ava Lopez today in clinic.  The patient's ear tube is in good placement.  I did debride her ear today in office.  Her audiogram would suggest some low-frequency conductive hearing loss and some high-frequency sensorineural loss.  I think that some of her ear fullness is related to the debris that I did remove today in office.  Also, the ear tube can maybe contribute to some fullness.  I would recommend observation at this time with follow-up examination in December with repeat audiometric assessment.  We will have the patient stop drop treatment at this time but she will Dundore eardrops if needed in the future.  We will see her back in December for follow-up.    Ezequiel Escobedo MD  Department of Otolaryngology-Head and Neck Surgery  [facility]         Again, thank you for allowing me to participate in the care of your patient.        Sincerely,        Ezequiel Escobedo MD

## 2021-11-22 NOTE — PROGRESS NOTES
AUDIOLOGY REPORT    SUBJECTIVE:  Ava Lopez is a 46 year old female who was seen at [facility] for an audiologic evaluation, referred by Dr. frances.   The patient reports a PE tube was inserted in her left ear on 11/15/2021. She continues to report left pressure, muffled hearing and sound sensitivity in the left ear. The patient denies bilateral otalgia and bilateral drainage.     OBJECTIVE:    Otoscopic exam indicates PE tubes present in the left ear and ears are clear of cerumen bilaterally       Pure Tone Thresholds assessed using conventional audiometry with good  reliability from 250-8000 Hz bilaterally using insert earphones and circumaural headphones     RIGHT:  normal hearing thresholds    LEFT:    normal, mild and moderate mixed hearing loss    Tympanogram:    RIGHT: normal eardrum mobility    LEFT:   large ear canal volume consistent with patent PE tubes    Reflexes (reported by stimulus ear 1000 Hz):     RIGHT: Ipsilateral is present    RIGHT: Contralateral is absent    LEFT: Ipsilateral is absent    LEFT: Contralateral is present    Speech Reception Threshold:    RIGHT: 15 dB HL    LEFT:   20 dB HL  Word Recognition Score:     RIGHT: 100% at 55 dB HL using NU-6 recorded word list.    LEFT:   96% at 60 dB HL using NU-6 recorded word list.      ASSESSMENT:   Normal hearing in the right ear with a moderate rising to normal sloping to mild high frequency mixed hearing loss in the left ear.     Today s results were discussed with the patient in detail.     PLAN: It is recommended that the patient be seen by Dr. Frances for medical evaluation of their ears and hearing evaluation.  . Patient was counseled regarding hearing loss and impact on communication.    Please call this clinic with questions regarding these results or recommendations.        Ashlyn Cassidy M.A. SABRINA-AAA  Clinical audiologist [place] # [id number]  11/22/2021

## 2021-12-12 ENCOUNTER — HEALTH MAINTENANCE LETTER (OUTPATIENT)
Age: 46
End: 2021-12-12

## 2022-06-30 ENCOUNTER — OFFICE VISIT (OUTPATIENT)
Dept: OTOLARYNGOLOGY | Facility: CLINIC | Age: 47
End: 2022-06-30
Payer: COMMERCIAL

## 2022-06-30 ENCOUNTER — TELEPHONE (OUTPATIENT)
Dept: OTOLARYNGOLOGY | Facility: CLINIC | Age: 47
End: 2022-06-30

## 2022-06-30 VITALS
WEIGHT: 170 LBS | HEART RATE: 76 BPM | DIASTOLIC BLOOD PRESSURE: 88 MMHG | OXYGEN SATURATION: 99 % | BODY MASS INDEX: 26.63 KG/M2 | SYSTOLIC BLOOD PRESSURE: 149 MMHG

## 2022-06-30 DIAGNOSIS — H73.22 MYRINGITIS OF LEFT EAR: ICD-10-CM

## 2022-06-30 DIAGNOSIS — H93.8X2 EAR FULLNESS, LEFT: ICD-10-CM

## 2022-06-30 DIAGNOSIS — H60.392 INFECTIVE OTITIS EXTERNA, LEFT: Primary | ICD-10-CM

## 2022-06-30 PROCEDURE — 99213 OFFICE O/P EST LOW 20 MIN: CPT | Mod: 25 | Performed by: OTOLARYNGOLOGY

## 2022-06-30 PROCEDURE — 92504 EAR MICROSCOPY EXAMINATION: CPT | Performed by: OTOLARYNGOLOGY

## 2022-06-30 RX ORDER — OXYCODONE HYDROCHLORIDE 5 MG/1
5 TABLET ORAL EVERY 4 HOURS PRN
Qty: 10 TABLET | Refills: 0 | Status: CANCELLED | OUTPATIENT
Start: 2022-06-30

## 2022-06-30 RX ORDER — PREDNISONE 10 MG/1
30 TABLET ORAL DAILY
Qty: 15 TABLET | Refills: 0 | Status: CANCELLED | OUTPATIENT
Start: 2022-06-30 | End: 2022-07-05

## 2022-06-30 RX ORDER — CIPROFLOXACIN AND DEXAMETHASONE 3; 1 MG/ML; MG/ML
SUSPENSION/ DROPS AURICULAR (OTIC)
Qty: 10 ML | Refills: 3 | Status: SHIPPED | OUTPATIENT
Start: 2022-06-30

## 2022-06-30 RX ORDER — SULFAMETHOXAZOLE/TRIMETHOPRIM 800-160 MG
1 TABLET ORAL 2 TIMES DAILY
Qty: 20 TABLET | Refills: 0 | Status: CANCELLED | OUTPATIENT
Start: 2022-06-30 | End: 2022-07-10

## 2022-06-30 RX ORDER — CIPROFLOXACIN AND DEXAMETHASONE 3; 1 MG/ML; MG/ML
SUSPENSION/ DROPS AURICULAR (OTIC)
Qty: 7.5 ML | Refills: 0 | Status: CANCELLED | OUTPATIENT
Start: 2022-06-30

## 2022-06-30 NOTE — NURSING NOTE
"Initial BP (!) 149/88 (BP Location: Right arm, Patient Position: Sitting, Cuff Size: Adult Regular)   Pulse 76   Wt 77.1 kg (170 lb)   SpO2 99%   BMI 26.63 kg/m   Estimated body mass index is 26.63 kg/m  as calculated from the following:    Height as of 11/15/21: 1.702 m (5' 7\").    Weight as of this encounter: 77.1 kg (170 lb). .    Jacque Palencia MA    "

## 2022-06-30 NOTE — TELEPHONE ENCOUNTER
Ezequiel Escobedo MD  You 2 minutes ago (9:49 AM)     Actually, I had a 120 cancel, so you could put her in that slot?    Message text       If she can come today before 1245, I can see her.  She may need debridement.  I can't see her Monday because of the holiday.     IJL    Message text       Patient called. Appointment scheduled    Betty FELDER RN  [facility]ty [facility]

## 2022-06-30 NOTE — TELEPHONE ENCOUNTER
[facility] Call Center    Phone Message    May a detailed message be left on voicemail: yes     Reason for Call: Symptoms or Concerns     If patient has red-flag symptoms, warm transfer to triage line    Current symptom or concern: Both ears have pressure, Left ear has a tube and was draining some yellow fluid this morning     Symptoms have been present for:  1 week(s)    Has patient previously been seen for this? Yes    By: Dr. Escobedo     Date: 11/22/2021    Are there any new or worsening symptoms? Yes: Patient states shes had a lot of pressure from both ears, the drainage just started this morning, but overall feels like something is wrong. Please reach out to patient to discuss. Thank you       Action Taken: Other: [place] [facility]     Travel Screening: Not Applicable

## 2022-06-30 NOTE — LETTER
6/30/2022         RE: Ava Lopez  [id number] [facility] [id number]        Dear Colleague,    Thank you for referring your patient, Ava Lopez, to the [facility]. Please see a copy of my visit note below.    Chief Complaint   Patient presents with     Ear Tube Follow Up     Patient reports having a tube in left ear since October 2021/feeling fullness and pressure bilaterally/drainage in left ear.Hearing popping in left ear and irritation  here to discus     History of Present Illness  Ava Lopez is a 46 year old female who presents today for follow-up.  I initially evaluated the patient on November with left otitis externa/otitis media.  She required ear tube placement, topical, oral antibiotic treatment.  She was last seen November 22, 2021 and she was doing well.  She contacted our office today with new symptoms and requested a same-day appointment.    From a symptom standpoint, the patient reports a several week history of bilateral ear fullness worse on the left.  She is also feeling quite imbalance/unsteady but is not having christine vertigo.  She does have issues and history of migraine headache couple times a month.  She had a small amount of otorrhea from the left this morning.  No bloody otorrhea.  Hearing seems about stable. Aside from her previous ear tube, she does not have any prior history of ear surgery.    Past Medical History  There is no problem list on file for this patient.    Current Medications    Current Outpatient Medications:      ciprofloxacin-dexamethasone (CIPRODEX) 0.3-0.1 % otic suspension, Place 5 drops in draining ear twice daily for 14 days., Disp: 10 mL, Rfl: 3     ciprofloxacin-dexamethasone (CIPRODEX) 0.3-0.1 % otic suspension, Place 4 drops Into the left ear 2 times daily (Patient not taking: Reported on 6/30/2022), Disp: , Rfl:      dextromethorphan-guaiFENesin (MUCINEX DM)  MG 12 hr tablet, Take 1 tablet by mouth  every 12 hours (Patient not taking: Reported on 6/30/2022), Disp: , Rfl:     Allergies  Allergies   Allergen Reactions     Benzodiazepines Headache       Social History  Social History     Socioeconomic History     Marital status:    Tobacco Use     Smoking status: Never Smoker     Smokeless tobacco: Never Used   Substance and Sexual Activity     Alcohol use: Never     Drug use: Never       Family History  Family History   Problem Relation Age of Onset     Coronary Artery Disease Father 36        MI       Review of Systems  As per HPI and PMHx, otherwise 10 system review including the head and neck, constitutional, eyes, respiratory, GI, skin, neurologic, lymphatic, endocrine, and allergy systems is negative.    Physical Exam  BP (!) 149/88 (BP Location: Right arm, Patient Position: Sitting, Cuff Size: Adult Regular)   Pulse 76   Wt 77.1 kg (170 lb)   SpO2 99%   BMI 26.63 kg/m    GENERAL: Patient is a pleasant, cooperative 46 year old female in no acute distress.  HEAD: Normocephalic, atraumatic.  Hair and scalp are normal.  EYES: Pupils are equal, round, reactive to light and accommodation.  Extraocular movements are intact.  The sclera nonicteric without injection.  The extraocular structures are normal.  EARS: See below.    NOSE: Nares are patent.  Nasal mucosa is pink and moist.  Negative anterior rhinoscopy.  NEUROLOGIC: Cranial nerves II through XII are grossly intact.  Voice is strong.  Patient is House-Brackmann I/VI bilaterally.  CARDIOVASCULAR: Extremities are warm and well-perfused.  No significant peripheral edema.  RESPIRATORY: Patient has nonlabored breathing without cough, wheeze, stridor.  PSYCHIATRIC: Patient is alert and oriented.  Mood and affect appear normal.  SKIN: Warm and dry.  No scalp, face, or neck lesions noted.    Physical Exam and Procedure    EARS: Normal shape and symmetry.  No tenderness when palpating the mastoid or tragal areas bilaterally.  The ears were then examined  under the otic binocular microscope to perform cerumen removal.  Otoscopic exam on the right reveals a clear canal.  The right tympanic membrane was round, intact without evidence of effusion.  No retraction, granulation, drainage, or evidence of cholesteatoma.      Attention was turned to the left ear.  Otoscopic exam on the left reveals moist ceruminous debris and a slight amount of otorrhea down to the level of the tympanic membrane.  There an extruded Dura-Vent ear tube on top of the eardrum.  The otorrhea and cerumen were removed with a #5 suction.  In the proximal portion of the canal and the cartilaginous portion, there was a small cystlike structure that looks like a slight amount of squamous debris underneath the skin.  This was unroofed with a straight pick and suctioned free.  There was some granulation of the left tympanic membrane.  The left tympanic membrane was intact without evidence of effusion.  There is no significant retraction, active copious drainage, or signs of middle ear cholesteatoma.      Assessment and Plan     ICD-10-CM    1. Infective otitis externa, left  H60.392 Remove Cerumen     ciprofloxacin-dexamethasone (CIPRODEX) 0.3-0.1 % otic suspension   2. Myringitis of left ear  H73.22 Remove Cerumen     ciprofloxacin-dexamethasone (CIPRODEX) 0.3-0.1 % otic suspension   3. Ear fullness, left  H93.8X2 Remove Cerumen     ciprofloxacin-dexamethasone (CIPRODEX) 0.3-0.1 % otic suspension      It was my pleasure seeing Ava Lopez today in clinic.  The patient's left ear tube has extruded.  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  We will place her on Ciprodex drops 5 drops to left ear twice daily for 2 weeks.  We will keep her current appointment scheduled at the end of August.  She knows to contact me if she is having worsening symptoms.     I am not sure why her right ear is bothering her, nor do I know why she is having  these dizzy episodes.  Its possible that she could have a migraine component or even have vestibular migraine.  Her [place]-Hallpike today in office was negative so positional vertigo was not likely contributing.  We will see if she improves now that we have cleaned out her ear and we will treat her left ear with Ciprodex drops.  I discussed keeping the ear dry, and to not place anything in the ear except for the ear drops.     I think she should have another audiogram, we will keep her audiogram as currently scheduled.  She can change her audiogram appointment time if necessary.    Ava to follow up with Primary Care provider regarding elevated blood pressure.    Ezequiel Escobedo MD  Department of Otolaryngology-Head and Neck Surgery  [facility]         Again, thank you for allowing me to participate in the care of your patient.        Sincerely,        Ezequiel Escobedo MD

## 2022-06-30 NOTE — PROGRESS NOTES
Chief Complaint   Patient presents with     Ear Tube Follow Up     Patient reports having a tube in left ear since October 2021/feeling fullness and pressure bilaterally/drainage in left ear.Hearing popping in left ear and irritation  here to discus     History of Present Illness  Ava Lopez is a 46 year old female who presents today for follow-up.  I initially evaluated the patient on November with left otitis externa/otitis media.  She required ear tube placement, topical, oral antibiotic treatment.  She was last seen November 22, 2021 and she was doing well.  She contacted our office today with new symptoms and requested a same-day appointment.    From a symptom standpoint, the patient reports a several week history of bilateral ear fullness worse on the left.  She is also feeling quite imbalance/unsteady but is not having christine vertigo.  She does have issues and history of migraine headache couple times a month.  She had a small amount of otorrhea from the left this morning.  No bloody otorrhea.  Hearing seems about stable. Aside from her previous ear tube, she does not have any prior history of ear surgery.    Past Medical History  There is no problem list on file for this patient.    Current Medications    Current Outpatient Medications:      ciprofloxacin-dexamethasone (CIPRODEX) 0.3-0.1 % otic suspension, Place 5 drops in draining ear twice daily for 14 days., Disp: 10 mL, Rfl: 3     ciprofloxacin-dexamethasone (CIPRODEX) 0.3-0.1 % otic suspension, Place 4 drops Into the left ear 2 times daily (Patient not taking: Reported on 6/30/2022), Disp: , Rfl:      dextromethorphan-guaiFENesin (MUCINEX DM)  MG 12 hr tablet, Take 1 tablet by mouth every 12 hours (Patient not taking: Reported on 6/30/2022), Disp: , Rfl:     Allergies  Allergies   Allergen Reactions     Benzodiazepines Headache       Social History  Social History     Socioeconomic History     Marital status:    Tobacco Use      Smoking status: Never Smoker     Smokeless tobacco: Never Used   Substance and Sexual Activity     Alcohol use: Never     Drug use: Never       Family History  Family History   Problem Relation Age of Onset     Coronary Artery Disease Father 36        MI       Review of Systems  As per HPI and PMHx, otherwise 10 system review including the head and neck, constitutional, eyes, respiratory, GI, skin, neurologic, lymphatic, endocrine, and allergy systems is negative.    Physical Exam  BP (!) 149/88 (BP Location: Right arm, Patient Position: Sitting, Cuff Size: Adult Regular)   Pulse 76   Wt 77.1 kg (170 lb)   SpO2 99%   BMI 26.63 kg/m    GENERAL: Patient is a pleasant, cooperative 46 year old female in no acute distress.  HEAD: Normocephalic, atraumatic.  Hair and scalp are normal.  EYES: Pupils are equal, round, reactive to light and accommodation.  Extraocular movements are intact.  The sclera nonicteric without injection.  The extraocular structures are normal.  EARS: See below.    NOSE: Nares are patent.  Nasal mucosa is pink and moist.  Negative anterior rhinoscopy.  NEUROLOGIC: Cranial nerves II through XII are grossly intact.  Voice is strong.  Patient is House-Brackmann I/VI bilaterally.  CARDIOVASCULAR: Extremities are warm and well-perfused.  No significant peripheral edema.  RESPIRATORY: Patient has nonlabored breathing without cough, wheeze, stridor.  PSYCHIATRIC: Patient is alert and oriented.  Mood and affect appear normal.  SKIN: Warm and dry.  No scalp, face, or neck lesions noted.    Physical Exam and Procedure    EARS: Normal shape and symmetry.  No tenderness when palpating the mastoid or tragal areas bilaterally.  The ears were then examined under the otic binocular microscope to perform cerumen removal.  Otoscopic exam on the right reveals a clear canal.  The right tympanic membrane was round, intact without evidence of effusion.  No retraction, granulation, drainage, or evidence of  cholesteatoma.      Attention was turned to the left ear.  Otoscopic exam on the left reveals moist ceruminous debris and a slight amount of otorrhea down to the level of the tympanic membrane.  There an extruded Dura-Vent ear tube on top of the eardrum.  The otorrhea and cerumen were removed with a #5 suction.  In the proximal portion of the canal and the cartilaginous portion, there was a small cystlike structure that looks like a slight amount of squamous debris underneath the skin.  This was unroofed with a straight pick and suctioned free.  There was some granulation of the left tympanic membrane.  The left tympanic membrane was intact without evidence of effusion.  There is no significant retraction, active copious drainage, or signs of middle ear cholesteatoma.      Assessment and Plan     ICD-10-CM    1. Infective otitis externa, left  H60.392 Remove Cerumen     ciprofloxacin-dexamethasone (CIPRODEX) 0.3-0.1 % otic suspension   2. Myringitis of left ear  H73.22 Remove Cerumen     ciprofloxacin-dexamethasone (CIPRODEX) 0.3-0.1 % otic suspension   3. Ear fullness, left  H93.8X2 Remove Cerumen     ciprofloxacin-dexamethasone (CIPRODEX) 0.3-0.1 % otic suspension      It was my pleasure seeing Ava STEVAN Quinterosagusto today in clinic.  The patient's left ear tube has extruded.  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  We will place her on Ciprodex drops 5 drops to left ear twice daily for 2 weeks.  We will keep her current appointment scheduled at the end of August.  She knows to contact me if she is having worsening symptoms.     I am not sure why her right ear is bothering her, nor do I know why she is having these dizzy episodes.  Its possible that she could have a migraine component or even have vestibular migraine.  Her [place]-Hallpike today in office was negative so positional vertigo was not likely contributing.  We will see if she improves now that we  have cleaned out her ear and we will treat her left ear with Ciprodex drops.  I discussed keeping the ear dry, and to not place anything in the ear except for the ear drops.     I think she should have another audiogram, we will keep her audiogram as currently scheduled.  She can change her audiogram appointment time if necessary.    Ava to follow up with Primary Care provider regarding elevated blood pressure.    Ezequiel Escobedo MD  Department of Otolaryngology-Head and Neck Surgery  [facility]

## 2022-06-30 NOTE — TELEPHONE ENCOUNTER
Patient las left ear tube in place.   Last office visit (11/21) patient had ear debrided.   Pressure on both sides. Popping and feeling of being underwater. Headache and dizziness noted as well.   Has tried ciprodex in past. Patient concerned tube has displaced or ear is needing to be debrided and cleaned again.     Please advise. Cued drops if you would like patient to start with that or would you like her to come in on a hold spot?     Thank you,   Betty FELDER RN  Specialty Clinics

## 2022-10-03 ENCOUNTER — HEALTH MAINTENANCE LETTER (OUTPATIENT)
Age: 47
End: 2022-10-03

## 2023-02-11 ENCOUNTER — HEALTH MAINTENANCE LETTER (OUTPATIENT)
Age: 48
End: 2023-02-11

## 2024-03-09 ENCOUNTER — HEALTH MAINTENANCE LETTER (OUTPATIENT)
Age: 49
End: 2024-03-09

## 2025-02-16 ENCOUNTER — HEALTH MAINTENANCE LETTER (OUTPATIENT)
Age: 50
End: 2025-02-16

## 2025-03-16 ENCOUNTER — HEALTH MAINTENANCE LETTER (OUTPATIENT)
Age: 50
End: 2025-03-16